# Patient Record
Sex: MALE | Race: WHITE
[De-identification: names, ages, dates, MRNs, and addresses within clinical notes are randomized per-mention and may not be internally consistent; named-entity substitution may affect disease eponyms.]

---

## 2018-10-11 ENCOUNTER — HOSPITAL ENCOUNTER (EMERGENCY)
Dept: HOSPITAL 47 - EC | Age: 33
Discharge: HOME | End: 2018-10-11
Payer: COMMERCIAL

## 2018-10-11 VITALS
RESPIRATION RATE: 21 BRPM | TEMPERATURE: 98.7 F | DIASTOLIC BLOOD PRESSURE: 87 MMHG | HEART RATE: 104 BPM | SYSTOLIC BLOOD PRESSURE: 152 MMHG

## 2018-10-11 DIAGNOSIS — F32.9: ICD-10-CM

## 2018-10-11 DIAGNOSIS — Z86.19: ICD-10-CM

## 2018-10-11 DIAGNOSIS — R11.10: ICD-10-CM

## 2018-10-11 DIAGNOSIS — Z79.899: ICD-10-CM

## 2018-10-11 DIAGNOSIS — F17.200: ICD-10-CM

## 2018-10-11 DIAGNOSIS — F43.10: ICD-10-CM

## 2018-10-11 DIAGNOSIS — F41.9: Primary | ICD-10-CM

## 2018-10-11 PROCEDURE — 99284 EMERGENCY DEPT VISIT MOD MDM: CPT

## 2018-10-11 PROCEDURE — 80306 DRUG TEST PRSMV INSTRMNT: CPT

## 2018-10-11 NOTE — ED
Anxiety HPI





- General


Chief Complaint: Anxiety


Stated Complaint: anxiety


Time Seen by Provider: 10/11/18 18:46


Source: patient


Mode of arrival: ambulatory





- History of Present Illness


Initial Comments: 


33-year-old male patient presents to the emergency department today with 

complaints of increased anxiety.  Patient is currently being treated for crack 

addiction at [facility].  Patient states that his methadone 

dose was delayed this morning which caused him to start having anxiety and feel 

ill.  Patient states that he did eventually get the dose but then vomited 

shortly after.  Patient states that he has had increased anxiety and racing 

thoughts since then.  The therapist at [facility] did fill out a petition to 

have the patient have a psychiatric evaluation, she reports that he was having 

profuse sweating, delusional thinking, paranoid thoughts including thinking 

that the woman who fed him was giving him wrap poisoning causing his cheeks 

distal department.  Patient denies any thoughts similar to this.  Denies any 

suicidal or homicidal ideation.  Denies any hallucinations. Patient denies any 

recent rash, fever, chills, shortness breath, chest pain, abdominal pain, nausea

, vomiting, diarrhea, constipation, back pain, numbness, tingling, dizziness, 

weakness, hematuria, dysuria, urinary urgency, urinary frequency, headache, 

visual changes, or any other complaints.





- Related Data


Home Medications: 


 Home Medications











 Medication  Instructions  Recorded  Confirmed


 


Acetaminophen [Tylenol] 650 mg PO Q4H PRN 10/11/18 10/11/18


 


Calcium/Magnesium  2 tab PO TID PRN 10/11/18 10/11/18


 


Chlorpheniramine Maleate 4 mg PO Q4H PRN 10/11/18 10/11/18





[Chlor-Trimeton]   


 


Escitalopram [Lexapro] 10 mg PO DAILY@0600 10/11/18 10/11/18


 


Ibuprofen [Motrin] 600 mg PO Q6HR PRN 10/11/18 10/11/18


 


Methadone HCl [Methadone Intensol] 115 mg PO DAILY 10/11/18 10/11/18


 


Multivitamins, Thera [Multivitamin 1 tab PO DAILY 10/11/18 10/11/18





(formulary)]   


 


OLANZapine [ZyPREXA] 5 mg PO HS@2200 10/11/18 10/11/18


 


Ranitidine HCl [Zantac] 150 mg PO BID 10/11/18 10/11/18


 


Thiamine [Vitamin B-1] 100 mg PO DAILY 10/11/18 10/11/18


 


busPIRone HCL 5 mg PO TID@06,1530,22 10/11/18 10/11/18


 


cloNIDine HCL [Catapres] 0.1 mg PO TID PRN 10/11/18 10/11/18


 


guaiFENesin [guaiFENesin Oral 200 mg PO Q4H PRN 10/11/18 10/11/18





Solution]   


 


lamoTRIgine [LaMICtal] 200 mg PO DAILY@0600 10/11/18 10/11/18


 


traZODone HCL 50 - 150 mg PO HS PRN 10/11/18 10/11/18











Allergies/Adverse Reactions: 


 Allergies











Allergy/AdvReac Type Severity Reaction Status Date / Time


 


No Known Allergies Allergy   Unverified 10/11/18 18:39














Review of Systems


ROS Statement: 


Those systems with pertinent positive or pertinent negative responses have been 

documented in the HPI.





ROS Other: All systems not noted in ROS Statement are negative.





Past Medical History


Additional Past Medical History / Comment(s): hepatitis C


History of Any Multi-Drug Resistant Organisms: None Reported


Past Surgical History: No Surgical Hx Reported


Past Psychological History: Anxiety, Depression, PTSD


Smoking Status: Current every day smoker


Past Alcohol Use History: None Reported


Past Drug Use History: Methamphetamine





General Exam


Limitations: no limitations


General appearance: alert, in no apparent distress, anxious, other (This is a 

well-developed, well-nourished adult male patient in no acute distress.  He is 

anxious and pacing in the room.  Vital signs upon presentation are temperature 

97.9F, pulse 70, respirations 18, blood pressure 139/84, pulse ox 97% on room 

air.)


Eye exam: Present: normal appearance, PERRL, EOMI.  Absent: scleral icterus, 

conjunctival injection, periorbital swelling


ENT exam: Present: normal exam, normal oropharynx, mucous membranes moist


Respiratory exam: Present: normal lung sounds bilaterally.  Absent: respiratory 

distress, wheezes, rales, rhonchi, stridor


Cardiovascular Exam: Present: regular rate, normal rhythm, normal heart sounds.

  Absent: systolic murmur, diastolic murmur, rubs, gallop, clicks


GI/Abdominal exam: Present: soft, normal bowel sounds.  Absent: distended, 

tenderness, guarding, rebound, rigid


Neurological exam: Present: alert, oriented X3, CN II-XII intact


Psychiatric exam: Present: normal affect, normal mood


Skin exam: Present: warm, dry, intact, normal color.  Absent: rash





Course


 Vital Signs











  10/11/18 10/11/18





  18:26 23:10


 


Temperature 97.9 F 98.7 F


 


Pulse Rate 70 104 H


 


Respiratory 18 21





Rate  


 


Blood Pressure 139/84 152/87


 


O2 Sat by Pulse 97 98





Oximetry  














Medical Decision Making





- Medical Decision Making


33-year-old male patient presented to the emergency department today for 

evaluation of increased anxiety related to missing a dose of his methadone this 

morning.  Patient was sent from [facility] and did have 

petition on the chart for mental health evaluation.  Physical examination was 

unremarkable.  Patient was given Ativan here in the department CONTROL 

symptoms.  He was seen and evaluated by Shavon from [facility],  

salt that he does not meet inpatient criteria at this time and is safe for 

discharge.  Patient will be discharged back to [facility].  He is instructed 

to follow-up with his primary care physician and outpatient mental health 

services as necessary.  Return parameters discussed in detail.  He verbalizes 

understanding and agrees with this plan.








- Lab Data


 Lab Results











  10/11/18 Range/Units





  18:30 


 


Urine Opiates Screen  Not Detected  (NotDetected)  


 


Ur Oxycodone Screen  Not Detected  (NotDetected)  


 


Urine Methadone Screen  Detected H  (NotDetected)  


 


Ur Propoxyphene Screen  Not Detected  (NotDetected)  


 


Ur Barbiturates Screen  Not Detected  (NotDetected)  


 


U Tricyclic Antidepress  Not Detected  (NotDetected)  


 


Ur Phencyclidine Scrn  Not Detected  (NotDetected)  


 


Ur Amphetamines Screen  Detected H  (NotDetected)  


 


U Methamphetamines Scrn  Detected H  (NotDetected)  


 


U Benzodiazepines Scrn  Not Detected  (NotDetected)  


 


Urine Cocaine Screen  Not Detected  (NotDetected)  


 


U Marijuana (THC) Screen  Not Detected  (NotDetected)  














Disposition


Clinical Impression: 


 Anxiety





Disposition: HOME SELF-CARE


Instructions:  Generalized Anxiety Disorder (ED)


Additional Instructions: 


Continue medication regimen as prescribed by [facility].  Follow-up with 

outpatient psychiatry as needed.  Return here immediately for any new, worsening

, or concerning symptoms.


Is patient prescribed a controlled substance at d/c from ED?: No


Referrals: 


Nonstaff,Physician [Primary Care Provider] - 1-2 days


Time of Disposition: 22:55